# Patient Record
Sex: FEMALE | Race: WHITE | URBAN - METROPOLITAN AREA
[De-identification: names, ages, dates, MRNs, and addresses within clinical notes are randomized per-mention and may not be internally consistent; named-entity substitution may affect disease eponyms.]

---

## 2017-06-14 ENCOUNTER — OFFICE VISIT (OUTPATIENT)
Dept: FAMILY MEDICINE CLINIC | Age: 37
End: 2017-06-14

## 2017-06-14 DIAGNOSIS — Z13.9 SCREENING: Primary | ICD-10-CM

## 2017-06-14 LAB
AMPHETAMINE SCREEN, URINE: NORMAL
BARBITURATE SCREEN, URINE: NORMAL
BENZODIAZEPINE SCREEN, URINE: NORMAL
CANNABINOID SCREEN URINE: NORMAL
COCAINE METABOLITE, URINE: NORMAL
MDMA URINE: NORMAL
METHADONE SCREEN, URINE: NORMAL
METHAMPHETAMINE, URINE: NORMAL
OPIATES, URINE: NORMAL
OXYCODONE: NORMAL
PCP: NORMAL
PHENCYCLIDINE, URINE: NORMAL
PROPOXYPHENE, URINE: NORMAL
TRICYCLIC ANTIDEPRESSANTS, UR: NORMAL

## 2017-06-14 PROCEDURE — WM1004 WORKMED DRUG COLLECTION: Performed by: NURSE PRACTITIONER

## 2017-08-29 ENCOUNTER — OFFICE VISIT (OUTPATIENT)
Dept: FAMILY MEDICINE CLINIC | Age: 37
End: 2017-08-29

## 2017-08-29 DIAGNOSIS — Z02.83 ENCOUNTER FOR DRUG SCREENING: Primary | ICD-10-CM

## 2017-08-29 PROCEDURE — WM1004 WORKMED DRUG COLLECTION: Performed by: NURSE PRACTITIONER

## 2023-08-09 ENCOUNTER — OFFICE VISIT (OUTPATIENT)
Dept: OBGYN CLINIC | Age: 43
End: 2023-08-09

## 2023-08-09 VITALS — WEIGHT: 234.4 LBS | SYSTOLIC BLOOD PRESSURE: 108 MMHG | BODY MASS INDEX: 35.64 KG/M2 | DIASTOLIC BLOOD PRESSURE: 60 MMHG

## 2023-08-09 DIAGNOSIS — N91.2 AMENORRHEA: Primary | ICD-10-CM

## 2023-08-09 LAB
CONTROL: NORMAL
PREGNANCY TEST URINE, POC: POSITIVE

## 2023-08-14 ENCOUNTER — OFFICE VISIT (OUTPATIENT)
Dept: OBGYN CLINIC | Age: 43
End: 2023-08-14
Payer: MEDICAID

## 2023-08-14 ENCOUNTER — HOSPITAL ENCOUNTER (OUTPATIENT)
Age: 43
Setting detail: SPECIMEN
Discharge: HOME | End: 2023-08-14

## 2023-08-14 VITALS — WEIGHT: 238.8 LBS | BODY MASS INDEX: 36.31 KG/M2 | DIASTOLIC BLOOD PRESSURE: 82 MMHG | SYSTOLIC BLOOD PRESSURE: 116 MMHG

## 2023-08-14 DIAGNOSIS — O02.1 MISSED AB: ICD-10-CM

## 2023-08-14 DIAGNOSIS — O02.1 MISSED AB: Primary | ICD-10-CM

## 2023-08-14 LAB
ABO + RH BLD: NORMAL
B-HCG SERPL EIA 3RD IS-ACNC: 1593 MIU/ML
BASOPHILS # BLD: 0.07 K/UL (ref 0–0.2)
BASOPHILS NFR BLD: 1 % (ref 0–2)
EOSINOPHIL # BLD: 0.18 K/UL (ref 0–0.44)
EOSINOPHILS RELATIVE PERCENT: 2 % (ref 1–4)
ERYTHROCYTE [DISTWIDTH] IN BLOOD BY AUTOMATED COUNT: 12.6 % (ref 11.8–14.4)
HCT VFR BLD AUTO: 41 % (ref 36.3–47.1)
HGB BLD-MCNC: 13.1 G/DL (ref 11.9–15.1)
IMM GRANULOCYTES # BLD AUTO: 0.03 K/UL (ref 0–0.3)
IMM GRANULOCYTES NFR BLD: 0 %
LYMPHOCYTES NFR BLD: 2.3 K/UL (ref 1.1–3.7)
LYMPHOCYTES RELATIVE PERCENT: 28 % (ref 24–43)
MCH RBC QN AUTO: 31.4 PG (ref 25.2–33.5)
MCHC RBC AUTO-ENTMCNC: 32 G/DL (ref 28.4–34.8)
MCV RBC AUTO: 98.3 FL (ref 82.6–102.9)
MONOCYTES NFR BLD: 0.51 K/UL (ref 0.1–1.2)
MONOCYTES NFR BLD: 6 % (ref 3–12)
NEUTROPHILS NFR BLD: 63 % (ref 36–65)
NEUTS SEG NFR BLD: 5.11 K/UL (ref 1.5–8.1)
NRBC BLD-RTO: 0 PER 100 WBC
PLATELET # BLD AUTO: 292 K/UL (ref 138–453)
PMV BLD AUTO: 10.7 FL (ref 8.1–13.5)
RBC # BLD AUTO: 4.17 M/UL (ref 3.95–5.11)
WBC OTHER # BLD: 8.2 K/UL (ref 3.5–11.3)

## 2023-08-14 PROCEDURE — 99213 OFFICE O/P EST LOW 20 MIN: CPT | Performed by: NURSE PRACTITIONER

## 2023-08-14 ASSESSMENT — ENCOUNTER SYMPTOMS
GASTROINTESTINAL NEGATIVE: 1
RESPIRATORY NEGATIVE: 1

## 2023-08-14 NOTE — PROGRESS NOTES
PROBLEM VISIT     Date of service: 2023    Esha Summers  Is a 37 y.o. female    PT's PCP is: No primary care provider on file. : 1980                                             Subjective:       Patient's last menstrual period was 2023 (exact date). OB History    Para Term  AB Living   8 4 4   4 4   SAB IAB Ectopic Molar Multiple Live Births   3 1       4      # Outcome Date GA Lbr Isaías/2nd Weight Sex Delivery Anes PTL Lv   8 2011           7 Term 12/08/10 37w0d   F Vag-Spont EPI  EDE   6 2009           5 Term 08 37w0d   M Vag-Spont EPI  EDE   4 SAB            3 Term 06 37w0d   F Vag-Spont EPI  EDE   2 IAB            1 Term 00 40w0d   F Vag-Spont EPI  EDE        Social History     Tobacco Use   Smoking Status Former    Packs/day: 0.25    Years: 0.00    Pack years: 0.00    Types: Cigarettes    Start date: 2023    Quit date: 2023    Years since quittin.1   Smokeless Tobacco Never        Social History     Substance and Sexual Activity   Alcohol Use Not Currently    Alcohol/week: 6.0 standard drinks    Types: 6 Cans of beer per week       Allergies: Patient has no known allergies. No current outpatient medications on file. Social History     Substance and Sexual Activity   Sexual Activity Yes    Partners: Male         Chief Complaint   Patient presents with    Follow-up     Follow up viability usn. Reports cramping and terrible headache over the weekend and pink spotting this a.m. PE:  Vital Signs  Blood pressure 116/82, weight 238 lb 12.8 oz (108.3 kg), last menstrual period 2023. HPI: .Patient here following viability ultrasound. Reports she has been experiencing pink spotting since this AM and cramping since the weekend. Headache has improved. PT denies fever, chills, nausea and vomiting       Review of Systems   Constitutional: Negative. Respiratory: Negative. Cardiovascular: Negative.

## 2023-08-16 DIAGNOSIS — O02.1 MISSED AB: Primary | ICD-10-CM

## 2023-08-16 NOTE — RESULT ENCOUNTER NOTE
Pt aware of results. Reports heavier bleeding with clotting currently. Unable to come in this week d/t work schedule. States she will come in on Monday for rpt HCG.  Order placed

## 2023-09-05 ENCOUNTER — TELEPHONE (OUTPATIENT)
Dept: OBGYN CLINIC | Age: 43
End: 2023-09-05

## 2023-09-05 NOTE — TELEPHONE ENCOUNTER
Patient has never come in for repeat hcg.  Can you please call and remind her this is recommended to assure SAB is complete

## 2024-04-01 ENCOUNTER — HOSPITAL ENCOUNTER (OUTPATIENT)
Age: 44
Setting detail: SPECIMEN
Discharge: HOME | End: 2024-04-01

## 2024-04-01 ENCOUNTER — TELEPHONE (OUTPATIENT)
Dept: OBGYN CLINIC | Age: 44
End: 2024-04-01

## 2024-04-01 DIAGNOSIS — Z32.01 POSITIVE PREGNANCY TEST: Primary | ICD-10-CM

## 2024-04-01 DIAGNOSIS — R79.89 LOW SERUM PROGESTERONE: ICD-10-CM

## 2024-04-01 DIAGNOSIS — Z32.01 POSITIVE PREGNANCY TEST: ICD-10-CM

## 2024-04-01 LAB
B-HCG SERPL EIA 3RD IS-ACNC: 9 MIU/ML (ref 0–7)
PROGEST SERPL-MCNC: 0.37 NG/ML

## 2024-04-01 NOTE — TELEPHONE ENCOUNTER
Pt called stating she got a positive HPT this passed Friday with a LMP 2/26/24. She has a hx of SAB. She states she had light spotting the Monday prior to the positive HPT. She just would like a phone call to discuss if there is anything she should do sooner.